# Patient Record
(demographics unavailable — no encounter records)

---

## 2020-03-16 NOTE — NUR
Patient discharged with v/s stable. Written and verbal after care instructions 
given and explained. 

Patient alert, oriented and verbalized understanding of instructions. 
Ambulatory with steady gait. All questions addressed prior to discharge. ID 
band removed. Patient advised to follow up with PMD. Rx of PREDNISONE, VISINE 
OPHTHALMIC SOLUTION, VALTREX given. Patient educated on indication of 
medication including possible reaction and side effects. Opportunity to ask 
questions provided and answered.

## 2020-03-16 NOTE — NUR
55 Y/O F C/O FACIAL NUMBNESS X O1OO TODAY AND LT KNEE PAIN X1 YR. LT FACIAL 
NUMBNESS, ASYMMETRICAL SMILE, LOST OF MOTOR SENSORY ON LT SIDE OF FACE, SPEECH 
CLEAR. AAO X4. EQUAL STRENGTH AND SENSATION ON BUE AND BLE. LFT KNEE SHOWS 
SWELLING AND PAIN DURING PALPATION. NO OBVIOUS DEFORMITY ON KNEE OR 
EXTREMITIES. VSS. STEADY GAIT. ABD ACTIVE BOWEL SOUNDS, ROUND SOFT, EPIGASTRIC 
TENDERNESS. 



HX: DIABETES, HTN, HYPERTHYROID, GERD, SCIATICA

ALLERGIES: NONE

## 2020-06-04 NOTE — NUR
55 Y/O FEMALE PRESENTED TO ED C/O LEFT KNEE PAIN X 5 DAYS . PT DENIES FALLING 
OR ANY TRAUMA TO KNEE. PT STATES IT HAS BEEN GETTING PROGRESSIVELY WORSE SINCE 
SATURDAY AND WENT TO WORK THIS MORNING AND IT WAS REALLY SWOLLEN. OBSERVED 
SWELLING NOTED AROUND LEFT KNEE. PT STATES STABBING PAIN 10/10. PT C/O NUMBNESS 
AND TINGLING ON LEFT FEET. PEDAL PULSES BL STRONG +2 , CAP REFIL <3 SEC.  PT 
DAUGHTER STATES SHE GAVE HER MOTHER IBUPROFEN AROUND 8 THIS MORNING . PT LAYING 
IN BED AT LOWEST POSITION, hob ELEVATED , SIDE RAIL X1 , DAUGHTER AT BEDSIDE. 



PMH: HTN, DM, hYPOTHY

NKA

## 2020-06-04 NOTE — NUR
Patient discharged with v/s stable. Written and verbal after care instructions 
given and explained. 

Patient alert, oriented and verbalized understanding of instructions. 
Ambulatory with steady gait. All questions addressed prior to discharge. ID 
band removed. Patient advised to follow up with PMD. Rx of norco 5mg-325mg 
given. Patient educated on indication of medication including possible reaction 
and side effects. Opportunity to ask questions provided and answered.

## 2020-07-30 NOTE — NUR
Patient discharged with v/s stable. Written and verbal after care instructions 
given and explained. 

Patient alert, oriented and verbalized understanding of instructions. 
Ambulatory with steady gait. All questions addressed prior to discharge. ID 
band removed. Patient advised to follow up with PMD. Rx of Naprosyn given. 
Patient educated on indication of medication including possible reaction and 
side effects. Opportunity to ask questions provided and answered.

## 2020-07-30 NOTE — NUR
PT C/O LT KNEE PAIN SINCE YESTERDAY, DENIES TRAUMA/INJURY. REPORTS HAD KNEE 
EXPIRATION ONE MONTH AGO WITH YELLOWISH FLUID. SLIGHT EDEMA W/O ERYTHEMA OR 
DEFORMITY NOTICED. PT HAS REDUCED ROM ON PASSIVE MOVEMENT. CAN AMBULATE WITH 
STEADY GAIT. PT DENIES ANY FEVER, CP, SOB, OR COUGH AT THIS TIME; PATIENT 
STATES PAIN OF 10/10 AT THIS TIME; VSS; PATIENT POSITIONED FOR COMFORT; HOB 
ELEVATED; BEDRAILS UP X1; BED DOWN. ER MD MADE AWARE OF PT STATUS.

## 2021-04-02 NOTE — NUR
PATIENT 54 Y/O FEMALE BIB SELF FOR C/O L KNEE PAIN 10/10. PATIENT STATES PAIN 
HAS BEEN CHRONIC X 1 YEAR BUT PAIN 10/10 X 1 DAY. PATIENT STATES HAD I&D DONE 
FROM KNEE X 1 YEAR AGO, "IT FEELS LIKE THE SAME THING IS HAPPENING AGAIN." 
PATIENT STATES PAIN IS PROVIOKED BY WALING. PATIENT DENIES ANY OTC MEDICATIONS 
TAKEN PRIOR TO ARRIVAL. PATIENT NOTED WITH SWELLING AROUND L KNEE. CMS INTACT, 
PEDAL PULSES STRONG AND EQUAL BILAT LE. CAP REFILL <3. PATIENT DENIES ANY FALL 
OR INJURY TO PRECIPITATE PAIN. 



MEDHX:DM TYPE II, HTN, HYPERTHYROID. 

ALLERGIES: NKA

## 2021-08-04 NOTE — NUR
Patient discharged with v/s stable. Written and verbal after care instructions 
given and explained. 

Patient alert, oriented and verbalized understanding of instructions. 
Ambulatory with steady gait. All questions addressed prior to discharge. ID 
band removed. Patient advised to follow up with PMD. Rx of FAMOTIDINE, 
ONDANSETRON given. Patient educated on indication of medication including 
possible reaction and side effects. Opportunity to ask questions provided and 
answered.

## 2021-08-04 NOTE — NUR
54 Y/O F BIB FAMILY FROM HOME, C/O EPIGASTRIC PAIN WITH N&V 4 DAYS 9/10 SHARP. 
DENIES SOB, COUGH, CP, FEVERS, DYSURIA, DIARRHEA OR CONSTIPATION. SKIN IS 
PINK/WARM/DRY; AAOX4 WITH EVEN AND STEADY GAIT; LUNGS CLEAR BL; HR EVEN AND 
REGULAR; PT DENIES ANY FEVER, CP, SOB, OR COUGH AT THIS TIME; VSS; ER MD MADE 
AWARE OF PT STATUS.



PMH: HTN, DM2, HYPERTHYROID

MED: METFORMIN 

NKA

## 2022-06-06 NOTE — NUR
56/F C/O EPIGASTRIC PAIN AND NAUSEA X4 DAYS, REPORTS ONE EPISODE OF VOMITING 
YESTERDAY. STATES SHE TOOK MEDICATION FOR SYMPTOMS WITH NO RELIEF, DENIES 
DIARRHEA, CP, SOB. VITALS WNL, A&OX4



PMH: HTN, DM, THYROID

NKA

## 2022-06-06 NOTE — NUR
Patient discharged with v/s stable. Written and verbal after care instructions 
given and explained. 

Patient alert, oriented and verbalized understanding of instructions. 
Ambulatory with steady gait. All questions addressed prior to discharge. ID 
band removed. Patient advised to follow up with PMD. Rx of PROTONIX AND 
CARAFATE given. Patient educated on indication of medication including possible 
reaction and side effects. Opportunity to ask questions provided and answered.

## 2022-11-01 NOTE — NUR
-------------------------------------------------------------------------------

            *** Note undone in ED - 11/01/22 at 1308 by AMOS ***             

-------------------------------------------------------------------------------

Dr. Chatterjee at bedside evaluating patient.

## 2022-11-01 NOTE — NUR
Patient discharged with v/s stable. Written and verbal after care instructions 
ABOUT LACERATION CARE given and explained. 

Patient alert, oriented and verbalized understanding of instructions. 
Ambulatory with steady gait. All questions addressed prior to discharge. ID 
band removed. Patient advised to follow up with PMD. Rx of TYLENOL EXTRA 
STRENGTH, KEFLEX AND BACITRACIN OINTMENT given. Patient educated on indication 
of medication including possible reaction and side effects. Opportunity to ask 
questions provided and answered.

## 2022-11-01 NOTE — NUR
57/Y/O FEMALE BIB SELF C/O LACERATION TO LEFT INDEX FINGER WHILE USING A KNIFE 
AT WORK, BLEEDING CONTROLLED WITH PRESSURE, NOTED OOZING BLOOD. NON-ADHERENT 
WITH COBAN PLACED ON AREA FOR PRESSURE PER ERMD. 





PMH: HTN, DM, THYROID

NKDA

## 2022-11-10 NOTE — NUR
57/F WALKED IN FOR SUTURE REMOVAL OF LEFT HAND 2ND DIGIT. PT WAS SEEN 10 DAYS 
AGO FOR LAC. DENIES ANY COMPLICATIONS SINCE LAST VISIT. SITE DOES NOT APPEAR 
SWOLLEN, NO DISCHARGE NOTED. AAO4, AMBULATORY, VITALS STABLE.



pmh: htn, dm2

nka

med: denies

## 2022-12-13 NOTE — NUR
Assumed care of pt and pt is in bed resting with no s/s of distress. A&Ox4. 
VSS. Pt has no c/o at this time. Skin intact. Ambualtory with steady gait. 
Deneis n.v. No chest pain and no sob. Has a 22g IV on right forearm intact no 
infiltration noted. Bed in lowest position.

## 2022-12-14 NOTE — NUR
PATIENT CALLED AND REQUESTED TYLENOL FOR HEADACHE. CHECKED PATIENT'S CHART, TYLENOL WAS 
APPROPRIATE TO GIVE. ADMINISTERED TYLENOL TO PATIENT. PATIENT TOLERATED WELL. PATIENT'S 
BREATHING IS NORMAL WITH SYMMETRICAL RISE AND FALL OF CHEST. WILL CONTINUE TO OBSERVE 
PATIENT.

## 2022-12-14 NOTE — NUR
PATIENT HAS BEEN SCREENED AND CATEGORIZED AS MODERATE NUTRITION RISK. PATIENT WILL BE SEEN 
WITHIN 3-5 DAYS OF ADMISSION.



12/17/2212/19/22



REVIEWED BY KRISTIN REINOSO RD

## 2022-12-14 NOTE — NUR
ADPatient will be admitted to care of DR SMITH. Admited to Med/Surg.  Will 
go to room 106A. Belongings list completed.  Report to ALVINA MOJICA.

## 2022-12-14 NOTE — NUR
OBTAINED PATIENT'S BS; BS , ADMINISTERED 6 UNITS OF HUMALOG FOR COVERAGE. ADMINISTER 
PATIENT'S SCHEDULED LANTUS 30 UNITS. PATIENT TOLERATED INJECTIONS WELL. WILL CONTINUE TO 
OBSERVE PATIENT.

## 2022-12-14 NOTE — NUR
RECEIVED REPORT FROM DAY SHIFT NURSE GALINA FOR CONTINUITY OF CARE. PATIENT IS A&O X4. PATIENT 
IS ON ROOM AIR, BREATHING IS NORMAL WITH SYMMETRICAL RISE AND FALL OF CHEST. PATIENT'S IV IS 
A 22G RFA, RUNNING . PATIENT IS SLEEPING, LYING SUPINE ON HER SIDE. BED IS IN LOWEST 
POSITION, WHEELS LOCKED, CALL LIGHT IN PLACE. WILL CONTINUE TO OBSERVE PATIENT.

## 2022-12-15 NOTE — NUR
WENT INTO PATIENT'S ROOM AND CHANGED PATIENT'S IV BAG. IV IS RUNNING NS . PATIENT IS 
SLEEPING, LYING SEMI-FOWLERS. BREATHING IS NORMAL WITH SYMMETRICAL RISE AND FALL OF CHEST. 
WILL CONTINUE TO OBSERVE PATIENT.

## 2022-12-15 NOTE — NUR
OBTAINED BS; BS , GAVE 6 UNITS. PATIENT TOLERATED WELL. ADMINISTERED SYNTHROID TO 
PATIENT. PATIENT TOLERATED WELL WITHOUT ANY ISSUES IN SWALLOWING. PATIENT'S BREATHING IS 
NORMAL WITH SYMMETRICAL RISE AND FALL OF CHEST. WILL CONTINUE TO OBSERVE PATIENT.

## 2022-12-15 NOTE — NUR
DISCHARGE PLANNING 

PATIENT IS A 57 YEAR OLD FEMALE ADMITTED TO THE [facility]/ED ON 12/14/2022 DUE TO HYPERGLYCEMIA. 

SW MEET WITH PATIENT AND HER ADULT SON AT BEDSIDE TO DISCUSS AND GATHER HER COLLATERAL 
INFORMATION. PATIENT WAS AWAKE AND ALERT ABLE TO GIVE ALL HER INFORMATION HER SELF. PATIENT 
REPORTED LIVING AT HOME WITH HER  KLEBER [phone] WHO IS HER 
EMERGENCY CONTACT AND MEDICAL DECISION MAKER. PER PATIENT SHE HAS PCP DR. ORLIN SOLIS AND A 
GOOD RELATIONSHIP WITH HER PCP WHICH SHE VISITS REGULARLY DUE TO HER DIABETES. LAST VISIT 
WITH PCP WAS ON 11/10/2022 AND HER NEXT SCHEDULED APPOINTMENT WILL BE ON 12/19/2022 AT 
11:00AM. PER PATIENT SHE HAS NO A.D AND DECLINED INF. FORMS PROVIDED BY FRANCES. PER PATIENT SHE 
HAS NO ISSUES GETTING OR TAKING HER MEDICATIONS THAT SHE GETS FROM THE [facility] IN 
[facility] IN [facility] AND [facility].  PER PATIENT SHE HAS NO DME AT HOME AND WANTS TO GO 
BACK HOME WHEN SHE IS READY AND STABLE TO DISCHARGE. PER PATIENT SHE WANTS TO GO HOME BUT IS 
OPEN FOR HOME HEALTH IF MD RECOMMENDS IT FOR HER SHE HAS NO PREFERENCES.  SW THANKED PATIENT 
FOR HER INFORMATION. FRANCES/CM WILL FOLLOW UP AS NEEDED.

## 2022-12-15 NOTE — NUR
LOOKED IN ON PATIENT. PATIENT WAS SLEEPING, LYING IN SEMI-FOWLERS POSITION. BREATHING WAS 
NORMAL WITH SYMMETRICAL RISE AND FALL OF CHEST. IV WAS RUNNING NS . WILL CONTINUE TO 
OBSERVE PATIENT.

## 2022-12-15 NOTE — NUR
LOOKED IN ON PATIENT. PATIENT WAS SLEEPING, LYING SEMI-FOWLERS POSITION. BREATHING WAS 
NORMAL WITH SYMMETRICAL RISE AND FALL OF CHEST. IV WAS RUNNING . WILL CONTINUE TO 
OBSERVE PATIENT.

## 2022-12-15 NOTE — NUR
patient discharged home. accompanied by family member. iv access and arm band removed. 
stable upon discharge.